# Patient Record
(demographics unavailable — no encounter records)

---

## 2024-10-11 NOTE — PHYSICAL EXAM
[No Acute Distress] : no acute distress [Normal Oropharynx] : normal oropharynx [Normal Appearance] : normal appearance [Supple] : supple [No JVD] : no jvd [Normal S1, S2] : normal s1, s2 [No Murmurs] : no murmurs [Normal to Percussion] : normal to percussion [No Clubbing] : no clubbing [No Cyanosis] : no cyanosis [No Edema] : no edema [No Abnormalities] : no abnormalities [Benign] : benign [Not Tender] : not tender [No HSM] : no hsm [TextBox_68] : Decrease BS left base.

## 2024-10-11 NOTE — ASSESSMENT
[FreeTextEntry1] : Restart Breo. Discussed Sniff test.  PRN beta agonist Decision on follow-up CT on return to office.  Likely no indication. F/U 6 months.   35 minutes spent in evaluation management and review of studies.

## 2024-10-11 NOTE — PROCEDURE
[FreeTextEntry1] : 10/11/2024 Pulmonary function testing There is a moderate ventilatory impairment in a combined obstructive and restrictive pattern. There is a mild reduction in lung volume. There is elevation in the RV/TLC ratio indicative of possible air trapping. There is a mild diffusion impairment. Corrects to normal with lung volume correction  Mild decrease in FEV1 from prior.

## 2024-10-11 NOTE — HISTORY OF PRESENT ILLNESS
[Never] : never [TextBox_4] :  Left message May 15 2024 regarding diaphragm study. Decreased motion of left diaphragm but no reported paradoxical motion. Had CT 2/24 Positive BURTON mild increase after first COVID in 2020.  on breo ran out about 1 month ago no cough or wheeze or chest congestion  definitely helps refuses vaccinations

## 2024-10-11 NOTE — CONSULT LETTER
[Dear  ___] : Dear ~BERENICE, [Consult Letter:] : I had the pleasure of evaluating your patient, [unfilled]. [Please see my note below.] : Please see my note below. [Consult Closing:] : Thank you very much for allowing me to participate in the care of this patient.  If you have any questions, please do not hesitate to contact me. [Sincerely,] : Sincerely, [FreeTextEntry2] : Iban Eric MD [FreeTextEntry3] : Ranjit Arriola MD FCCP\par

## 2025-02-28 NOTE — PHYSICAL EXAM
[No Acute Distress] : no acute distress [Normal Oropharynx] : normal oropharynx [Normal Appearance] : normal appearance [Supple] : supple [No JVD] : no jvd [Normal S1, S2] : normal s1, s2 [No Murmurs] : no murmurs [Normal to Percussion] : normal to percussion [No Abnormalities] : no abnormalities [Benign] : benign [Not Tender] : not tender [No HSM] : no hsm [No Clubbing] : no clubbing [No Cyanosis] : no cyanosis [No Edema] : no edema [TextBox_68] : Decrease BS left base.

## 2025-02-28 NOTE — HISTORY OF PRESENT ILLNESS
[Never] : never [TextBox_4] : Got Uri 3 weeks ago and went to [facility] and was given doxy but only took for 4 days because of side effects father 96 recently  from pneumonia took cough medicine at first nothing recently on breo daily no albuterol to use some mucus clear no wheeze He feels Symptoms slowly improving.

## 2025-02-28 NOTE — REASON FOR VISIT
[Follow-Up] : a follow-up visit [Shortness of Breath] : shortness of breath [TextBox_44] : chest congestion

## 2025-02-28 NOTE — DISCUSSION/SUMMARY
[FreeTextEntry1] : Status post upper respiratory tract infection with persistent respiratory symptoms now improving. Elevated left hemidiaphragm with diaphragmatic dysfunction but no paradoxical motion. Component of airflow limitation.  Decrease in FEV1 off of bronchodilator therapy. Pulmonary nodule.  Low risk patient small.

## 2025-02-28 NOTE — ASSESSMENT
[FreeTextEntry1] : Close observation. Continue Breo. Follow-up CT when clinically improved. Referred to Dr. Guerin at [facility]. F/U 6 months.  Sooner on a as needed basis. Will call or follow-up in 2 weeks if symptoms do not resolve.

## 2025-02-28 NOTE — PROCEDURE
[FreeTextEntry1] : 02/28/2025 Pulmonary function testing Moderate restrictive ventilatory impairment.  No significant change in flow rates compared to October 11, 2024.  Mildly reduced from prior.

## 2025-06-23 NOTE — DISCUSSION/SUMMARY
[FreeTextEntry1] : Elevated left hemidiaphragm with diaphragmatic dysfunction but no paradoxical motion. Component of airflow limitation.  Decrease in FEV1 off of bronchodilator therapy. Pulmonary nodule.  Low risk patient small. S/P exacerbation improved.

## 2025-06-23 NOTE — PROCEDURE
[FreeTextEntry1] : Ct chest 5/19/25 reivewed  02/28/2025 Pulmonary function testing Moderate restrictive ventilatory impairment.  No significant change in flow rates compared to October 11, 2024.  Mildly reduced from prior.

## 2025-06-23 NOTE — HISTORY OF PRESENT ILLNESS
[Never] : never [TextBox_4] : Here for f/u had recent ct chest doing well on breo daily. BURTON relatively stable. Feels positive response to Breo.  no albuterol to use no cough or wheeze  no wheeze Essentially baseline